# Patient Record
Sex: MALE | Race: WHITE | NOT HISPANIC OR LATINO
[De-identification: names, ages, dates, MRNs, and addresses within clinical notes are randomized per-mention and may not be internally consistent; named-entity substitution may affect disease eponyms.]

---

## 2017-01-27 ENCOUNTER — APPOINTMENT (OUTPATIENT)
Dept: PULMONOLOGY | Facility: CLINIC | Age: 67
End: 2017-01-27

## 2017-05-26 ENCOUNTER — APPOINTMENT (OUTPATIENT)
Dept: PULMONOLOGY | Facility: CLINIC | Age: 67
End: 2017-05-26

## 2017-05-26 VITALS
OXYGEN SATURATION: 97 % | HEIGHT: 64 IN | HEART RATE: 81 BPM | WEIGHT: 155 LBS | TEMPERATURE: 98.6 F | SYSTOLIC BLOOD PRESSURE: 128 MMHG | BODY MASS INDEX: 26.46 KG/M2 | DIASTOLIC BLOOD PRESSURE: 80 MMHG

## 2017-05-26 RX ORDER — ESCITALOPRAM OXALATE 10 MG/1
10 TABLET ORAL
Qty: 30 | Refills: 0 | Status: ACTIVE | COMMUNITY
Start: 2016-09-30

## 2017-06-04 ENCOUNTER — FORM ENCOUNTER (OUTPATIENT)
Age: 67
End: 2017-06-04

## 2017-06-05 ENCOUNTER — OUTPATIENT (OUTPATIENT)
Dept: OUTPATIENT SERVICES | Facility: HOSPITAL | Age: 67
LOS: 1 days | End: 2017-06-05
Payer: MEDICARE

## 2017-06-05 PROCEDURE — 71250 CT THORAX DX C-: CPT

## 2017-06-05 PROCEDURE — 71250 CT THORAX DX C-: CPT | Mod: 26

## 2017-06-20 ENCOUNTER — MESSAGE (OUTPATIENT)
Age: 67
End: 2017-06-20

## 2019-06-05 ENCOUNTER — APPOINTMENT (OUTPATIENT)
Dept: PULMONOLOGY | Facility: CLINIC | Age: 69
End: 2019-06-05
Payer: MEDICARE

## 2019-06-05 VITALS
HEART RATE: 79 BPM | WEIGHT: 153 LBS | BODY MASS INDEX: 26.12 KG/M2 | DIASTOLIC BLOOD PRESSURE: 86 MMHG | HEIGHT: 64 IN | OXYGEN SATURATION: 97 % | SYSTOLIC BLOOD PRESSURE: 130 MMHG | TEMPERATURE: 98.1 F

## 2019-06-05 PROCEDURE — 94729 DIFFUSING CAPACITY: CPT

## 2019-06-05 PROCEDURE — 99213 OFFICE O/P EST LOW 20 MIN: CPT | Mod: 25

## 2019-06-05 PROCEDURE — 94727 GAS DIL/WSHOT DETER LNG VOL: CPT

## 2019-06-05 PROCEDURE — 94060 EVALUATION OF WHEEZING: CPT

## 2019-06-05 NOTE — CONSULT LETTER
[Dear  ___] : Dear  [unfilled], [Courtesy Letter:] : I had the pleasure of seeing your patient, [unfilled], in my office today. [Please see my note below.] : Please see my note below. [Consult Closing:] : Thank you very much for allowing me to participate in the care of this patient.  If you have any questions, please do not hesitate to contact me. [Sincerely,] : Sincerely, [FreeTextEntry2] : Coy Ojeda MD \par [address]\par [address]\[place] [address] [FreeTextEntry3] : Gita Murry M.D.

## 2019-06-05 NOTE — HISTORY OF PRESENT ILLNESS
[FreeTextEntry1] : 4/24/15: Asked to evaluate patient by Dr Jomar Barclay for cough. 40 pack yr former smoker. Cough was improving but he had rales on the left. \par \par 5/22/14: Returns today for reexamination regarding the rales on the left. There is no change in his symptoms. From a lung point of view he is feeling fine without significant cough or dyspnea.\par \par 7/10/15: Returns for re-exam for left lung rales. No dyspnea. Minimal cough, some PND. Nothing he wants treatment for. Shortly thereafter had a normal full PFT and we planned to do a follow up PFT in 6-12 mos.\par \par 1/29/16:Has a cold today but otherwise feels well, no dyspnea or chronic cough. Had full PFT today.\par \par 2/11/16: Mr. Velasquez comes in today for followup after his recent chest CT which demonstrated very mild interstitial changes, which would be consistent with his exam and a lack of symptoms and normal lung function. I took the opportunity to clarify with him that he has no pets, no history of industrial exposures, no history of rheumatologic disease, no history of treatment for any malignancy, and no family history of lung or rheumatologic disease, although his mother did die at 82 of respiratory failure after long history of smoking. He does relate a history of somewhat severe pneumonia as a young child from which he did recover. He remains asymptomatic from a lung point of view.\par \par 7/29/16: Mr. Velasquez remains asymptomatic from a lung point of view. He has mild dyspnea on stairs but is otherwise well. He notes that he has gained a little bit of weight. He has had no intercurrent illnesses but he has been started on a statin since I saw him last.\par \par 5/26/17: Has had a cough for about a month, getting better. Some PND. Not dyspneic. Continues to work full time. Smokes 5 cigars a week!\par \par 6/5/19: Comes in for routine follow up. A few days ago developed a dry cough with a feeling of chest congestion. Symbicort has helped w similar symptoms in the past. Former smoker w no hx asthma. Quit cigarettes 1980. No chronic cough and no dyspnea. No sig change to health.

## 2019-06-05 NOTE — PHYSICAL EXAM
[General Appearance - In No Acute Distress] : no acute distress [Heart Sounds] : normal S1 and S2 [Murmurs] : no murmurs present [FreeTextEntry1] : very scant rales L base

## 2019-06-05 NOTE — ASSESSMENT
[FreeTextEntry1] : Data reviewed:\par \par Chest CT [facility] 6/2017 personally reviewed w pt: stable interstitial abnormality\par \par Rheum panel 2/20/16 negative except EDEN 1:80\par \par PFT 7/15/15: FVC 72%, TLC 91%, DLCO 113%\par PFT 1/29/16: FVC 76%, %, DLCO 116%\par PFT 7/29/16: FVC 75%, TLC 84%, DLCO 84%\par PFT 5/26/17: FVC 77%, %, DLCO 96%\par PFT 6/5/19:  FVC 79%, TLC 86%, DLCO 84%\par \par Impression:\par Subclinical ILD - asymptomatic, normal lung function\par \par Plan:\par No change in his lung function and no chronic symptoms. Defer further imaging at this time.\par Would continue to follow annually.\par Ok to use Symbicort intermittent prn cough. But for chronic cough should return.

## 2019-07-05 ENCOUNTER — RX RENEWAL (OUTPATIENT)
Age: 69
End: 2019-07-05

## 2020-01-27 ENCOUNTER — APPOINTMENT (OUTPATIENT)
Dept: OTOLARYNGOLOGY | Facility: CLINIC | Age: 70
End: 2020-01-27
Payer: MEDICARE

## 2020-01-27 VITALS
HEIGHT: 64 IN | HEART RATE: 65 BPM | SYSTOLIC BLOOD PRESSURE: 115 MMHG | BODY MASS INDEX: 25.1 KG/M2 | WEIGHT: 147 LBS | DIASTOLIC BLOOD PRESSURE: 72 MMHG

## 2020-01-27 DIAGNOSIS — H90.3 SENSORINEURAL HEARING LOSS, BILATERAL: ICD-10-CM

## 2020-01-27 DIAGNOSIS — H69.83 OTHER SPECIFIED DISORDERS OF EUSTACHIAN TUBE, BILATERAL: ICD-10-CM

## 2020-01-27 DIAGNOSIS — H93.13 TINNITUS, BILATERAL: ICD-10-CM

## 2020-01-27 PROCEDURE — 92567 TYMPANOMETRY: CPT

## 2020-01-27 PROCEDURE — 92557 COMPREHENSIVE HEARING TEST: CPT

## 2020-01-27 PROCEDURE — 99203 OFFICE O/P NEW LOW 30 MIN: CPT

## 2020-01-27 NOTE — ASSESSMENT
[FreeTextEntry1] : exam unremarkable\par audio au loss more pronounced on rt excellent disc\par tinitus\par rec hearng aid reeval

## 2020-01-27 NOTE — REVIEW OF SYSTEMS
[Hearing Loss] : hearing loss [Ear Noises] : ear noises [Sense Of Smell Problem] : sense of smell problem [Snoring With Pauses] : snoring with pauses [Itching] : itching [Anxiety] : anxiety [Depression] : depression [Negative] : Heme/Lymph [Patient Intake Form Reviewed] : Patient intake form was reviewed

## 2020-01-27 NOTE — REASON FOR VISIT
[Initial Consultation] : an initial consultation for [Hearing Loss] : hearing loss [FreeTextEntry2] : ringing in ears

## 2020-01-27 NOTE — HISTORY OF PRESENT ILLNESS
[de-identified] : bilateral longstanding buzzing constant\par hx hearing loss hearing aids but not wearing

## 2020-03-02 ENCOUNTER — APPOINTMENT (OUTPATIENT)
Dept: PULMONOLOGY | Facility: CLINIC | Age: 70
End: 2020-03-02

## 2020-04-20 ENCOUNTER — APPOINTMENT (OUTPATIENT)
Dept: OTOLARYNGOLOGY | Facility: CLINIC | Age: 70
End: 2020-04-20

## 2020-09-07 ENCOUNTER — LABORATORY RESULT (OUTPATIENT)
Age: 70
End: 2020-09-07

## 2020-09-10 ENCOUNTER — OUTPATIENT (OUTPATIENT)
Dept: OUTPATIENT SERVICES | Facility: HOSPITAL | Age: 70
LOS: 1 days | End: 2020-09-10
Payer: MEDICARE

## 2020-09-10 DIAGNOSIS — J84.9 INTERSTITIAL PULMONARY DISEASE, UNSPECIFIED: ICD-10-CM

## 2020-09-10 PROCEDURE — 94760 N-INVAS EAR/PLS OXIMETRY 1: CPT

## 2020-09-10 PROCEDURE — 94726 PLETHYSMOGRAPHY LUNG VOLUMES: CPT

## 2020-09-10 PROCEDURE — 94060 EVALUATION OF WHEEZING: CPT

## 2020-09-10 PROCEDURE — 94729 DIFFUSING CAPACITY: CPT

## 2020-09-14 ENCOUNTER — APPOINTMENT (OUTPATIENT)
Dept: PULMONOLOGY | Facility: CLINIC | Age: 70
End: 2020-09-14
Payer: MEDICARE

## 2020-09-14 VITALS
TEMPERATURE: 97.5 F | BODY MASS INDEX: 25.61 KG/M2 | DIASTOLIC BLOOD PRESSURE: 66 MMHG | OXYGEN SATURATION: 97 % | WEIGHT: 150 LBS | HEART RATE: 68 BPM | SYSTOLIC BLOOD PRESSURE: 120 MMHG | HEIGHT: 64 IN

## 2020-09-14 PROCEDURE — 99213 OFFICE O/P EST LOW 20 MIN: CPT

## 2020-09-16 ENCOUNTER — TRANSCRIPTION ENCOUNTER (OUTPATIENT)
Age: 70
End: 2020-09-16

## 2020-09-16 ENCOUNTER — OUTPATIENT (OUTPATIENT)
Dept: OUTPATIENT SERVICES | Facility: HOSPITAL | Age: 70
LOS: 1 days | End: 2020-09-16

## 2020-09-16 ENCOUNTER — APPOINTMENT (OUTPATIENT)
Dept: CT IMAGING | Facility: CLINIC | Age: 70
End: 2020-09-16
Payer: MEDICARE

## 2020-09-16 PROCEDURE — 71250 CT THORAX DX C-: CPT | Mod: 26

## 2020-09-21 NOTE — HISTORY OF PRESENT ILLNESS
[TextBox_4] : My patient since 2015 w subclinical ILD, 40py former smoker, current cigar smoker, with no other evidence exposures.\par \par 9/14/20: Comes for routine follow up. No change to chest. No dyspnea, no cough, still smoking cigars. Had a GI illness in Feb, no chest symptoms, fever or anosmia; didn't need care, just waited it out, Covid Abs subsequently negative.

## 2020-09-21 NOTE — ASSESSMENT
[FreeTextEntry1] : Data reviewed:\par \par Chest CT [facility] 6/2017 personally reviewed w pt: stable interstitial abnormality\par \par Rheum panel 2/20/16 negative except EDEN 1:80\par \par PFT 7/15/15: FVC 72%, TLC 91%, DLCO 113%\par PFT 1/29/16: FVC 76%, %, DLCO 116%\par PFT 7/29/16: FVC 75%, TLC 84%, DLCO 84%\par PFT 5/26/17: FVC 77%, %, DLCO 96%\par PFT 6/5/19: FVC 79%, TLC 86%, DLCO 84%\par PFT 9/10/20: FVC 2.35L (67%), TLC 4.57L (90%), DLCO 16.6 (77%)\par \par Impression:\par Subclinical ILD - asymptomatic, normal lung function\par \par Plan:\par No change in his lung function and no chronic symptoms.\par As it's been 3 years since his previous CT, will repeat a CT now to assess for progression.\par --\par CT chest [facility] 9/16/20 personally reviewed :\par 1. Since June 5, 2017, slight increased patchy subpleural reticulation suggesting fibrosis more prominent in posterior lower lobes with traction bronchiolectasis. Given no definite honeycombing and small focus peripheral groundglass opacity in left lower lobe findings are indeterminate. Possibly interstitial lung disease UIP pattern versus fibrotic NSIP. Histologic sampling might be considered. \par 2. Expiratory air trapping suggesting small airways disease. \par 3. 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 \par \par The progression is really minimal over 3 years and still no specific features. Will repeat PFT in a year and consider repeat CT at that time. LM on .

## 2020-09-21 NOTE — PHYSICAL EXAM
[Normal Rate/Rhythm] : normal rate/rhythm [No Acute Distress] : no acute distress [Normal S1, S2] : normal s1, s2 [No Resp Distress] : no resp distress [No Murmurs] : no murmurs [TextBox_68] : dano dyson

## 2021-11-08 ENCOUNTER — LABORATORY RESULT (OUTPATIENT)
Age: 71
End: 2021-11-08

## 2021-11-11 ENCOUNTER — APPOINTMENT (OUTPATIENT)
Dept: PULMONOLOGY | Facility: CLINIC | Age: 71
End: 2021-11-11

## 2021-11-11 ENCOUNTER — APPOINTMENT (OUTPATIENT)
Dept: PULMONOLOGY | Facility: CLINIC | Age: 71
End: 2021-11-11
Payer: MEDICARE

## 2021-11-11 VITALS
BODY MASS INDEX: 24.75 KG/M2 | OXYGEN SATURATION: 97 % | SYSTOLIC BLOOD PRESSURE: 121 MMHG | TEMPERATURE: 98 F | HEART RATE: 83 BPM | HEIGHT: 64 IN | WEIGHT: 145 LBS | DIASTOLIC BLOOD PRESSURE: 78 MMHG

## 2021-11-11 PROCEDURE — 99213 OFFICE O/P EST LOW 20 MIN: CPT | Mod: 25

## 2021-11-11 PROCEDURE — 94727 GAS DIL/WSHOT DETER LNG VOL: CPT

## 2021-11-11 PROCEDURE — 94729 DIFFUSING CAPACITY: CPT

## 2021-11-11 PROCEDURE — 94060 EVALUATION OF WHEEZING: CPT

## 2021-11-11 NOTE — ASSESSMENT
[FreeTextEntry1] : Data reviewed:\par \par CT chest [facility] 9/16/20 personally reviewed :\par 1. Since June 5, 2017, slight increased patchy subpleural reticulation suggesting fibrosis more prominent in posterior lower lobes with traction bronchiolectasis. Given no definite honeycombing and small focus peripheral groundglass opacity in left lower lobe findings are indeterminate. Possibly interstitial lung disease UIP pattern versus fibrotic NSIP. Histologic sampling might be considered. \par 2. Expiratory air trapping suggesting small airways disease. \par 3. 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 \par \par Rheum panel 2/20/16 negative except EDEN 1:80\par \par PFT 7/15/15: FVC 2.67L (72%), FEV1 2.43L (88%), TLC 4.70 (91%), DLCO 20.0 (113%)\par PFT 1/29/16: FVC 2.82L (76%), %, DLCO 20.5 (116%)\par PFT 7/29/16: FVC 2.75L (75%), TLC 84%, DLCO 16.9 (97%)\par PFT 5/26/17: FVC 2.83L (77%), %, DLCO 17.9 (96%)\par PFT 6/5/19: FVC 2.85L (79%), TLC 86%, DLCO 15.1 (84%)\par PFT 9/10/20: FVC 2.35L (67%), TLC 4.57L (90%), DLCO 16.6 (77%)\par PFT 11/11/21: FVC 2.75L (83%), FEV1 2.30 (95%), TLC 3.95L (69%), DLCO 15.9 (69%)\par \par Impression:\par Subclinical ILD - asymptomatic, nearly normal lung function\par \par Plan:\par He remains stable with this mild or subclinical ILD. A specific diagnosis would require a lung biopsy and we agree that this is not reasonable at this time. We will continue to monitor w PFT in one year and of course he will call w any clinical change. He is fully vaxxed.

## 2021-11-11 NOTE — HISTORY OF PRESENT ILLNESS
[TextBox_4] : My patient since 2015 w subclinical ILD, 40py former smoker, current cigar smoker, with no other evidence exposures. Did work around [employer], not there on 9/11 however.\par \par 9/14/20: Comes for routine follow up. No change to chest. No dyspnea, no cough, still smoking cigars. Had a GI illness in Feb, no chest symptoms, fever or anosmia; didn't need care, just waited it out, Covid Abs subsequently negative.\par \par 11/11/21: Doing absolutely fine. Has had no illness at all. Still smoking cigars. 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 No formal exercise.

## 2021-11-11 NOTE — CONSULT LETTER
[Dear  ___] : Dear  [unfilled], [Courtesy Letter:] : I had the pleasure of seeing your patient, [unfilled], in my office today. [Please see my note below.] : Please see my note below. [Consult Closing:] : Thank you very much for allowing me to participate in the care of this patient.  If you have any questions, please do not hesitate to contact me. [Sincerely,] : Sincerely, [FreeTextEntry2] : Coy Ojeda MD \par [address] [facility] [address]\par [address]\[place] [address] [FreeTextEntry3] : Gita Murry M.D.

## 2021-11-11 NOTE — PHYSICAL EXAM
[No Acute Distress] : no acute distress [Normal Rate/Rhythm] : normal rate/rhythm [Normal S1, S2] : normal s1, s2 [No Murmurs] : no murmurs [No Resp Distress] : no resp distress [TextBox_68] : dano dyson

## 2022-11-10 ENCOUNTER — APPOINTMENT (OUTPATIENT)
Dept: PULMONOLOGY | Facility: CLINIC | Age: 72
End: 2022-11-10

## 2023-04-12 ENCOUNTER — APPOINTMENT (OUTPATIENT)
Dept: PULMONOLOGY | Facility: CLINIC | Age: 73
End: 2023-04-12
Payer: MEDICARE

## 2023-04-12 VITALS
SYSTOLIC BLOOD PRESSURE: 126 MMHG | HEART RATE: 76 BPM | BODY MASS INDEX: 25.02 KG/M2 | HEIGHT: 63.5 IN | OXYGEN SATURATION: 97 % | WEIGHT: 143 LBS | DIASTOLIC BLOOD PRESSURE: 80 MMHG | TEMPERATURE: 98 F

## 2023-04-12 PROCEDURE — 94727 GAS DIL/WSHOT DETER LNG VOL: CPT

## 2023-04-12 PROCEDURE — 99214 OFFICE O/P EST MOD 30 MIN: CPT | Mod: 25

## 2023-04-12 PROCEDURE — 94060 EVALUATION OF WHEEZING: CPT

## 2023-04-12 PROCEDURE — 94729 DIFFUSING CAPACITY: CPT

## 2023-04-12 NOTE — ASSESSMENT
[FreeTextEntry1] : Data reviewed:\par \par CT chest [facility] 9/16/20 personally reviewed :\par 1. Since June 5, 2017, slight increased patchy subpleural reticulation suggesting fibrosis more prominent in posterior lower lobes with traction bronchiolectasis. Given no definite honeycombing and small focus peripheral groundglass opacity in left lower lobe findings are indeterminate. Possibly interstitial lung disease UIP pattern versus fibrotic NSIP. Histologic sampling might be considered. \par 2. Expiratory air trapping suggesting small airways disease. \par 3. 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 \par \par Rheum panel 2/20/16 negative except EDEN 1:80\par \par PFT 7/15/15: FVC 2.67L (72%), FEV1 2.43L (88%), TLC 4.70 (91%), DLCO 20.0 (113%)\par PFT 1/29/16: FVC 2.82L (76%), %, DLCO 20.5 (116%)\par PFT 7/29/16: FVC 2.75L (75%), TLC 84%, DLCO 16.9 (97%)\par PFT 5/26/17: FVC 2.83L (77%), %, DLCO 17.9 (96%)\par PFT 6/5/19: FVC 2.85L (79%), TLC 86%, DLCO 15.1 (84%)\par PFT 9/10/20: FVC 2.35L (67%), TLC 4.57L (90%), DLCO 16.6 (77%)\par PFT 11/11/21: FVC 2.75L (83%), FEV1 2.30 (95%), TLC 3.95L (69%), DLCO 15.9 (69%)\par PFT 4/12/2023: FVC 2.66L (81%), FEV1 2.29L (97%), TLC 4.01L (70%), DLCO 15.08 (72%)\par \par Impression:\par Interstitial lung abnormality - asymptomatic, nearly normal lung function\par \par Plan:\par His DLCO is slowly trending down over 8 years.\par Repeat HRCT now.\par Advise smoking cessation and regular exercise.\par

## 2023-04-16 ENCOUNTER — APPOINTMENT (OUTPATIENT)
Dept: CT IMAGING | Facility: HOSPITAL | Age: 73
End: 2023-04-16

## 2023-04-16 ENCOUNTER — OUTPATIENT (OUTPATIENT)
Dept: OUTPATIENT SERVICES | Facility: HOSPITAL | Age: 73
LOS: 1 days | End: 2023-04-16
Payer: MEDICARE

## 2023-04-16 PROCEDURE — 71250 CT THORAX DX C-: CPT

## 2023-04-16 PROCEDURE — 71250 CT THORAX DX C-: CPT | Mod: 26,MH

## 2023-05-02 ENCOUNTER — APPOINTMENT (OUTPATIENT)
Dept: PULMONOLOGY | Facility: CLINIC | Age: 73
End: 2023-05-02
Payer: MEDICARE

## 2023-05-02 VITALS
TEMPERATURE: 98.4 F | WEIGHT: 143 LBS | BODY MASS INDEX: 25.02 KG/M2 | DIASTOLIC BLOOD PRESSURE: 84 MMHG | HEART RATE: 77 BPM | OXYGEN SATURATION: 98 % | HEIGHT: 63.5 IN | SYSTOLIC BLOOD PRESSURE: 110 MMHG

## 2023-05-02 PROCEDURE — 99213 OFFICE O/P EST LOW 20 MIN: CPT

## 2023-05-02 RX ORDER — DULOXETINE HYDROCHLORIDE 60 MG/1
60 CAPSULE, DELAYED RELEASE PELLETS ORAL
Qty: 90 | Refills: 0 | Status: DISCONTINUED | COMMUNITY
Start: 2019-11-21 | End: 2023-05-02

## 2023-05-02 RX ORDER — DULOXETINE HYDROCHLORIDE 60 MG/1
60 CAPSULE, DELAYED RELEASE ORAL
Refills: 0 | Status: DISCONTINUED | COMMUNITY
End: 2023-05-02

## 2023-05-02 RX ORDER — DULOXETINE HYDROCHLORIDE 40 MG/1
CAPSULE, DELAYED RELEASE PELLETS ORAL
Refills: 0 | Status: ACTIVE | COMMUNITY

## 2023-05-02 RX ORDER — DULOXETINE HYDROCHLORIDE 30 MG/1
30 CAPSULE, DELAYED RELEASE PELLETS ORAL
Qty: 30 | Refills: 0 | Status: DISCONTINUED | COMMUNITY
Start: 2016-09-30 | End: 2023-05-02

## 2023-05-02 RX ORDER — BUDESONIDE AND FORMOTEROL FUMARATE DIHYDRATE 80; 4.5 UG/1; UG/1
80-4.5 AEROSOL RESPIRATORY (INHALATION) TWICE DAILY
Qty: 1 | Refills: 11 | Status: DISCONTINUED | COMMUNITY
Start: 2017-06-20 | End: 2023-05-02

## 2023-05-02 RX ORDER — MIRTAZAPINE 7.5 MG/1
7.5 TABLET, FILM COATED ORAL
Qty: 60 | Refills: 0 | Status: DISCONTINUED | COMMUNITY
Start: 2020-01-16 | End: 2023-05-02

## 2023-05-02 NOTE — CONSULT LETTER
[Dear  ___] : Dear  [unfilled], [Courtesy Letter:] : I had the pleasure of seeing your patient, [unfilled], in my office today. [Please see my note below.] : Please see my note below. [Consult Closing:] : Thank you very much for allowing me to participate in the care of this patient.  If you have any questions, please do not hesitate to contact me. [Sincerely,] : Sincerely, [FreeTextEntry2] : Coy Ojeda MD \par [address] [facility]\par [address]\[place] [address] [FreeTextEntry3] : Gita Murry M.D.

## 2023-05-02 NOTE — HISTORY OF PRESENT ILLNESS
[TextBox_4] : My patient since 2015 w subclinical ILD, 40py former smoker, current cigar smoker, with no other evidence exposures. Did work around [employer], not there on 9/11 however.\par \par 9/14/20: Comes for routine follow up. No change to chest. No dyspnea, no cough, still smoking cigars. Had a GI illness in Feb, no chest symptoms, fever or anosmia; didn't need care, just waited it out, Covid Abs subsequently negative.\par \par 11/11/21: Doing absolutely fine. Has had no illness at all. Still smoking cigars. 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 Some cough, no sig dyspnea. Rejoined his gym. Lives in [place], very walkable.\par \par 5/2/2023: He returns at my request to discuss his recent scan. He has been having some constipation but otherwise he feels the same.

## 2023-06-21 ENCOUNTER — APPOINTMENT (OUTPATIENT)
Dept: PULMONOLOGY | Facility: CLINIC | Age: 73
End: 2023-06-21
Payer: MEDICARE

## 2023-06-21 VITALS
HEIGHT: 63.5 IN | WEIGHT: 143 LBS | OXYGEN SATURATION: 98 % | TEMPERATURE: 89.3 F | SYSTOLIC BLOOD PRESSURE: 126 MMHG | HEART RATE: 99 BPM | BODY MASS INDEX: 25.02 KG/M2 | DIASTOLIC BLOOD PRESSURE: 70 MMHG

## 2023-06-21 PROCEDURE — 99213 OFFICE O/P EST LOW 20 MIN: CPT | Mod: 25,GC

## 2023-06-21 PROCEDURE — 36415 COLL VENOUS BLD VENIPUNCTURE: CPT | Mod: GC

## 2023-06-21 NOTE — PHYSICAL EXAM
[No Acute Distress] : no acute distress [Normal Oropharynx] : normal oropharynx [Normal Appearance] : normal appearance [No Neck Mass] : no neck mass [Normal Rate/Rhythm] : normal rate/rhythm [Normal S1, S2] : normal s1, s2 [No Murmurs] : no murmurs [No Resp Distress] : no resp distress [Clear to Auscultation Bilaterally] : clear to auscultation bilaterally [No Abnormalities] : no abnormalities [Benign] : benign [No Clubbing] : no clubbing [No Cyanosis] : no cyanosis [No Edema] : no edema [Normal Color/ Pigmentation] : normal color/ pigmentation [Oriented x3] : oriented x3 [Normal Affect] : normal affect [TextBox_68] : faint bibasilar fine crackles

## 2023-06-21 NOTE — HISTORY OF PRESENT ILLNESS
[TextBox_4] : My patient since 2015 w subclinical ILD, 40py former smoker, current cigar smoker, with no other evidence exposures. Did work around [employer], not there on 9/11 however.\par \par 9/14/20: Comes for routine follow up. No change to chest. No dyspnea, no cough, still smoking cigars. Had a GI illness in Feb, no chest symptoms, fever or anosmia; didn't need care, just waited it out, Covid Abs subsequently negative.\par \par 11/11/21: Doing absolutely fine. Has had no illness at all. Still smoking cigars. 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 Some cough, no sig dyspnea. Rejoined his gym. Lives in [place], very walkable.\par \par 5/2/2023: He returns at my request to discuss his recent scan. He has been having some constipation but otherwise he feels the same.\par \par 6/21/23 [Alexa]: here today for follow up and to discuss initiation of anti fibrotic agents. Discussed with his specialty pharmacy and insurance - out of pocket costs for ofev will be over 3000 dollars for the first month and subsequently over 600 dollars monthly. Fatigued, but otherwise no jenny dyspnea. Occasionally will smoke a cigar due to stress from work, but trying to stop. Otherwise no hospitalizations and healthy and well.

## 2023-06-21 NOTE — ASSESSMENT
[FreeTextEntry1] : Data reviewed:\par \par CT chest [facility] 4/2023 personally reviewed : worsening of probable UIP pattern compared to 2020, 2016\par \par Rheum panel 2/20/16 negative except EDEN 1:80\par \par PFT 7/15/15: FVC 2.67L (72%), FEV1 2.43L (88%), TLC 4.70 (91%), DLCO 20.0 (113%)\par PFT 1/29/16: FVC 2.82L (76%), %, DLCO 20.5 (116%)\par PFT 7/29/16: FVC 2.75L (75%), TLC 84%, DLCO 16.9 (97%)\par PFT 5/26/17: FVC 2.83L (77%), %, DLCO 17.9 (96%)\par PFT 6/5/19: FVC 2.85L (79%), TLC 86%, DLCO 15.1 (84%)\par PFT 9/10/20: FVC 2.35L (67%), TLC 4.57L (90%), DLCO 16.6 (77%)\par PFT 11/11/21: FVC 2.75L (83%), FEV1 2.30 (95%), TLC 3.95L (69%), DLCO 15.9 (69%)\par PFT 4/12/2023: FVC 2.66L (81%), FEV1 2.29L (97%), TLC 4.01L (70%), DLCO 15.08 (72%)\par \par Impression:\par Interstitial lung disesase / probable UIP - asymptomatic\par \par Plan:\par This healthy 71yo man has slowly progressive ILD with a declining DLCO and a worsening scan. His disease remains mild. He is asymptomatic.\par We could continue to monitor. However, I think we could also put him on antifibrotics at this point with a goal of reducing progression over time. We discussed the pros and cons and he is still agreeable to start, however, given financial contraints will opt for esbriet over ofev. \par LFTs in office today and will order esbriet. LFTs monthly x first 3 mos.\par \par RTC in 6 weeks. Hope for him to beon esbriet for 1 month at that point. \par --\par Attending Addendum\par \par Seen and examined by me and agree w above. Out of pocket costs for Ofev are extreme. Use of Esbriet is perfectly reasonable here w much lower costs associated. Check LFTs today and start Esbriet, follow in 6 weeks.

## 2023-06-21 NOTE — CONSULT LETTER
[Dear  ___] : Dear  [unfilled], [Courtesy Letter:] : I had the pleasure of seeing your patient, [unfilled], in my office today. [Please see my note below.] : Please see my note below. [Consult Closing:] : Thank you very much for allowing me to participate in the care of this patient.  If you have any questions, please do not hesitate to contact me. [Sincerely,] : Sincerely, [FreeTextEntry2] : Coy Ojeda MD \par [address] [facility]\par [address]\[place] [address] [FreeTextEntry3] : Gita Murry M.D.

## 2023-06-22 LAB
ALBUMIN SERPL ELPH-MCNC: 4.6 G/DL
ALP BLD-CCNC: 80 U/L
ALT SERPL-CCNC: 18 U/L
ANION GAP SERPL CALC-SCNC: 13 MMOL/L
AST SERPL-CCNC: 14 U/L
BILIRUB SERPL-MCNC: 0.6 MG/DL
BUN SERPL-MCNC: 21 MG/DL
CALCIUM SERPL-MCNC: 9.6 MG/DL
CHLORIDE SERPL-SCNC: 103 MMOL/L
CO2 SERPL-SCNC: 25 MMOL/L
CREAT SERPL-MCNC: 1.27 MG/DL
EGFR: 60 ML/MIN/1.73M2
GLUCOSE SERPL-MCNC: 141 MG/DL
POTASSIUM SERPL-SCNC: 4.5 MMOL/L
PROT SERPL-MCNC: 7.3 G/DL
SODIUM SERPL-SCNC: 142 MMOL/L

## 2023-08-14 ENCOUNTER — NON-APPOINTMENT (OUTPATIENT)
Age: 73
End: 2023-08-14

## 2023-08-15 RX ORDER — BUDESONIDE AND FORMOTEROL FUMARATE DIHYDRATE 80; 4.5 UG/1; UG/1
80-4.5 AEROSOL RESPIRATORY (INHALATION) TWICE DAILY
Qty: 1 | Refills: 3 | Status: DISCONTINUED | COMMUNITY
Start: 2023-08-14 | End: 2023-08-15

## 2023-08-17 ENCOUNTER — APPOINTMENT (OUTPATIENT)
Dept: PULMONOLOGY | Facility: CLINIC | Age: 73
End: 2023-08-17
Payer: MEDICARE

## 2023-08-17 VITALS
SYSTOLIC BLOOD PRESSURE: 118 MMHG | DIASTOLIC BLOOD PRESSURE: 70 MMHG | HEART RATE: 91 BPM | WEIGHT: 143 LBS | OXYGEN SATURATION: 95 % | TEMPERATURE: 98.1 F | HEIGHT: 63.5 IN | BODY MASS INDEX: 25.02 KG/M2

## 2023-08-17 PROCEDURE — 99213 OFFICE O/P EST LOW 20 MIN: CPT | Mod: 25

## 2023-08-17 PROCEDURE — 36415 COLL VENOUS BLD VENIPUNCTURE: CPT

## 2023-08-17 NOTE — HISTORY OF PRESENT ILLNESS
[TextBox_4] : My patient since 2015 w subclinical ILD, 40py former smoker, current cigar smoker, with no other evidence exposures. Did work around [employer], not there on 9/11 however.  9/14/20: Comes for routine follow up. No change to chest. No dyspnea, no cough, still smoking cigars. Had a GI illness in Feb, no chest symptoms, fever or anosmia; didn't need care, just waited it out, Covid Abs subsequently negative.  11/11/21: Doing absolutely fine. Has had no illness at all. Still smoking cigars. 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 No formal exercise.  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 Some cough, no sig dyspnea. Rejoined his gym. Lives in [place], very walkable.  5/2/2023: He returns at my request to discuss his recent scan. He has been having some constipation but otherwise he feels the same.  6/21/23 [Alexa]: here today for follow up and to discuss initiation of anti fibrotic agents. Discussed with his specialty pharmacy and insurance - out of pocket costs for ofev will be over 3000 dollars for the first month and subsequently over 600 dollars monthly. Fatigued, but otherwise no jenny dyspnea. Occasionally will smoke a cigar due to stress from work, but trying to stop. Otherwise no hospitalizations and healthy and well.  8/17/2023: He returns after initiating pirfenidone. He experienced no GI side effects. He did find it difficult to organize his day around the need to eat tid at specific intervals to allow for taking the medication. He felt tired after taking the medication. His insurance approved the first month but then would not approve the next month so he's been out of meds for 11 days.

## 2023-08-17 NOTE — ASSESSMENT
[FreeTextEntry1] : Data reviewed:  CT chest [facility] 4/2023 prev reviewed : worsening of probable UIP pattern compared to 2020, 2016  Rheum panel 2/20/16 negative except EDEN 1:80  PFT 7/15/15: FVC 2.67L (72%), FEV1 2.43L (88%), TLC 4.70 (91%), DLCO 20.0 (113%) PFT 1/29/16: FVC 2.82L (76%), %, DLCO 20.5 (116%) PFT 7/29/16: FVC 2.75L (75%), TLC 84%, DLCO 16.9 (97%) PFT 5/26/17: FVC 2.83L (77%), %, DLCO 17.9 (96%) PFT 6/5/19: FVC 2.85L (79%), TLC 86%, DLCO 15.1 (84%) PFT 9/10/20: FVC 2.35L (67%), TLC 4.57L (90%), DLCO 16.6 (77%) PFT 11/11/21: FVC 2.75L (83%), FEV1 2.30 (95%), TLC 3.95L (69%), DLCO 15.9 (69%) PFT 4/12/2023: FVC 2.66L (81%), FEV1 2.29L (97%), TLC 4.01L (70%), DLCO 15.08 (72%)  Impression: Interstitial lung disesase / probable UIP - asymptomatic Esbriet 7/2023 -  Plan: Continue with Esbriet. We got the continuation of therapy approved. Check LFTs today. Return 6 weeks.

## 2023-08-21 LAB
ALBUMIN SERPL ELPH-MCNC: 4.5 G/DL
ALP BLD-CCNC: 64 U/L
ALT SERPL-CCNC: 14 U/L
AST SERPL-CCNC: 11 U/L
BILIRUB DIRECT SERPL-MCNC: 0.1 MG/DL
BILIRUB INDIRECT SERPL-MCNC: 0.3 MG/DL
BILIRUB SERPL-MCNC: 0.4 MG/DL
PROT SERPL-MCNC: 6.9 G/DL

## 2023-09-28 ENCOUNTER — APPOINTMENT (OUTPATIENT)
Dept: PULMONOLOGY | Facility: CLINIC | Age: 73
End: 2023-09-28
Payer: MEDICARE

## 2023-09-28 VITALS
HEART RATE: 82 BPM | OXYGEN SATURATION: 97 % | DIASTOLIC BLOOD PRESSURE: 84 MMHG | TEMPERATURE: 98.1 F | WEIGHT: 143 LBS | SYSTOLIC BLOOD PRESSURE: 120 MMHG | BODY MASS INDEX: 25.34 KG/M2 | HEIGHT: 63 IN

## 2023-09-28 PROCEDURE — 90662 IIV NO PRSV INCREASED AG IM: CPT

## 2023-09-28 PROCEDURE — 99213 OFFICE O/P EST LOW 20 MIN: CPT | Mod: 25

## 2023-09-28 PROCEDURE — G0008: CPT

## 2023-09-29 LAB
ALBUMIN SERPL ELPH-MCNC: 4.6 G/DL
ALP BLD-CCNC: 71 U/L
ALT SERPL-CCNC: 21 U/L
AST SERPL-CCNC: 13 U/L
BILIRUB DIRECT SERPL-MCNC: 0.1 MG/DL
BILIRUB INDIRECT SERPL-MCNC: 0.3 MG/DL
BILIRUB SERPL-MCNC: 0.4 MG/DL
PROT SERPL-MCNC: 7 G/DL

## 2023-10-18 ENCOUNTER — RX RENEWAL (OUTPATIENT)
Age: 73
End: 2023-10-18

## 2023-10-24 ENCOUNTER — NON-APPOINTMENT (OUTPATIENT)
Age: 73
End: 2023-10-24

## 2023-10-24 ENCOUNTER — APPOINTMENT (OUTPATIENT)
Dept: OPHTHALMOLOGY | Facility: CLINIC | Age: 73
End: 2023-10-24
Payer: MEDICARE

## 2023-10-24 PROCEDURE — 92004 COMPRE OPH EXAM NEW PT 1/>: CPT

## 2023-10-24 PROCEDURE — 92015 DETERMINE REFRACTIVE STATE: CPT

## 2023-10-27 ENCOUNTER — APPOINTMENT (OUTPATIENT)
Dept: PULMONOLOGY | Facility: CLINIC | Age: 73
End: 2023-10-27
Payer: MEDICARE

## 2023-10-27 VITALS
WEIGHT: 143 LBS | HEART RATE: 69 BPM | BODY MASS INDEX: 25.34 KG/M2 | DIASTOLIC BLOOD PRESSURE: 85 MMHG | HEIGHT: 63 IN | OXYGEN SATURATION: 98 % | SYSTOLIC BLOOD PRESSURE: 140 MMHG | TEMPERATURE: 97.7 F

## 2023-10-27 PROCEDURE — 36415 COLL VENOUS BLD VENIPUNCTURE: CPT

## 2023-10-27 PROCEDURE — 99213 OFFICE O/P EST LOW 20 MIN: CPT | Mod: 25

## 2023-10-30 LAB
ALBUMIN SERPL ELPH-MCNC: 4.7 G/DL
ALP BLD-CCNC: 74 U/L
ALT SERPL-CCNC: 20 U/L
AST SERPL-CCNC: 15 U/L
BILIRUB DIRECT SERPL-MCNC: 0.1 MG/DL
BILIRUB INDIRECT SERPL-MCNC: 0.2 MG/DL
BILIRUB SERPL-MCNC: 0.3 MG/DL
PROT SERPL-MCNC: 7.2 G/DL

## 2023-11-27 ENCOUNTER — RX RENEWAL (OUTPATIENT)
Age: 73
End: 2023-11-27

## 2023-11-30 ENCOUNTER — APPOINTMENT (OUTPATIENT)
Dept: PULMONOLOGY | Facility: CLINIC | Age: 73
End: 2023-11-30
Payer: MEDICARE

## 2023-11-30 VITALS
HEART RATE: 74 BPM | OXYGEN SATURATION: 95 % | WEIGHT: 143 LBS | TEMPERATURE: 97.7 F | SYSTOLIC BLOOD PRESSURE: 118 MMHG | BODY MASS INDEX: 25.34 KG/M2 | HEIGHT: 63 IN | DIASTOLIC BLOOD PRESSURE: 86 MMHG

## 2023-11-30 PROCEDURE — 36415 COLL VENOUS BLD VENIPUNCTURE: CPT

## 2023-11-30 PROCEDURE — 99213 OFFICE O/P EST LOW 20 MIN: CPT | Mod: 25

## 2023-12-01 LAB
ALBUMIN SERPL ELPH-MCNC: 4.4 G/DL
ALP BLD-CCNC: 89 U/L
ALT SERPL-CCNC: 24 U/L
AST SERPL-CCNC: 14 U/L
BILIRUB DIRECT SERPL-MCNC: 0.1 MG/DL
BILIRUB INDIRECT SERPL-MCNC: 0.2 MG/DL
BILIRUB SERPL-MCNC: 0.2 MG/DL
PROT SERPL-MCNC: 6.7 G/DL

## 2024-01-23 ENCOUNTER — APPOINTMENT (OUTPATIENT)
Dept: PULMONOLOGY | Facility: CLINIC | Age: 74
End: 2024-01-23
Payer: MEDICARE

## 2024-01-23 VITALS
DIASTOLIC BLOOD PRESSURE: 80 MMHG | BODY MASS INDEX: 25.34 KG/M2 | SYSTOLIC BLOOD PRESSURE: 130 MMHG | OXYGEN SATURATION: 95 % | HEIGHT: 63 IN | WEIGHT: 143 LBS | HEART RATE: 89 BPM | TEMPERATURE: 97.3 F

## 2024-01-23 PROCEDURE — 94729 DIFFUSING CAPACITY: CPT

## 2024-01-23 PROCEDURE — 36415 COLL VENOUS BLD VENIPUNCTURE: CPT

## 2024-01-23 PROCEDURE — 94060 EVALUATION OF WHEEZING: CPT

## 2024-01-23 PROCEDURE — 99213 OFFICE O/P EST LOW 20 MIN: CPT | Mod: 25

## 2024-01-23 PROCEDURE — 94727 GAS DIL/WSHOT DETER LNG VOL: CPT

## 2024-01-24 LAB
ALBUMIN SERPL ELPH-MCNC: 4.4 G/DL
ALP BLD-CCNC: 83 U/L
ALT SERPL-CCNC: 27 U/L
AST SERPL-CCNC: 16 U/L
BILIRUB DIRECT SERPL-MCNC: 0.1 MG/DL
BILIRUB INDIRECT SERPL-MCNC: 0.2 MG/DL
BILIRUB SERPL-MCNC: 0.3 MG/DL
PROT SERPL-MCNC: 7 G/DL

## 2024-02-28 ENCOUNTER — APPOINTMENT (OUTPATIENT)
Dept: PULMONOLOGY | Facility: CLINIC | Age: 74
End: 2024-02-28
Payer: MEDICARE

## 2024-02-28 VITALS
DIASTOLIC BLOOD PRESSURE: 88 MMHG | SYSTOLIC BLOOD PRESSURE: 140 MMHG | WEIGHT: 157 LBS | BODY MASS INDEX: 27.82 KG/M2 | OXYGEN SATURATION: 96 % | HEIGHT: 63 IN | HEART RATE: 87 BPM

## 2024-02-28 PROCEDURE — 36415 COLL VENOUS BLD VENIPUNCTURE: CPT

## 2024-02-28 PROCEDURE — 99213 OFFICE O/P EST LOW 20 MIN: CPT

## 2024-02-28 PROCEDURE — G2211 COMPLEX E/M VISIT ADD ON: CPT

## 2024-02-28 RX ORDER — BUDESONIDE AND FORMOTEROL FUMARATE DIHYDRATE 80; 4.5 UG/1; UG/1
80-4.5 AEROSOL RESPIRATORY (INHALATION) TWICE DAILY
Qty: 1 | Refills: 2 | Status: DISCONTINUED | COMMUNITY
Start: 2024-02-28 | End: 2024-02-28

## 2024-02-28 RX ORDER — FLUTICASONE FUROATE AND VILANTEROL TRIFENATATE 100; 25 UG/1; UG/1
100-25 POWDER RESPIRATORY (INHALATION)
Qty: 1 | Refills: 3 | Status: ACTIVE | COMMUNITY
Start: 2023-08-15 | End: 1900-01-01

## 2024-02-29 LAB
ALBUMIN SERPL ELPH-MCNC: 4.4 G/DL
ALP BLD-CCNC: 87 U/L
ALT SERPL-CCNC: 16 U/L
AST SERPL-CCNC: 12 U/L
BILIRUB DIRECT SERPL-MCNC: 0.1 MG/DL
BILIRUB INDIRECT SERPL-MCNC: 0.2 MG/DL
BILIRUB SERPL-MCNC: 0.3 MG/DL
PROT SERPL-MCNC: 7.1 G/DL

## 2024-05-02 ENCOUNTER — APPOINTMENT (OUTPATIENT)
Dept: PULMONOLOGY | Facility: CLINIC | Age: 74
End: 2024-05-02
Payer: MEDICARE

## 2024-05-02 VITALS
HEART RATE: 89 BPM | WEIGHT: 157 LBS | DIASTOLIC BLOOD PRESSURE: 80 MMHG | BODY MASS INDEX: 27.82 KG/M2 | OXYGEN SATURATION: 96 % | SYSTOLIC BLOOD PRESSURE: 120 MMHG | HEIGHT: 63 IN | TEMPERATURE: 97.6 F

## 2024-05-02 DIAGNOSIS — J84.9 INTERSTITIAL PULMONARY DISEASE, UNSPECIFIED: ICD-10-CM

## 2024-05-02 PROCEDURE — 36415 COLL VENOUS BLD VENIPUNCTURE: CPT

## 2024-05-02 PROCEDURE — G2211 COMPLEX E/M VISIT ADD ON: CPT

## 2024-05-02 PROCEDURE — 99213 OFFICE O/P EST LOW 20 MIN: CPT

## 2024-05-02 RX ORDER — PIRFENIDONE 267 MG/1
267 TABLET, COATED ORAL
Qty: 270 | Refills: 5 | Status: ACTIVE | COMMUNITY
Start: 2023-06-28 | End: 1900-01-01

## 2024-05-03 LAB
ALBUMIN SERPL ELPH-MCNC: 4.6 G/DL
ALP BLD-CCNC: 82 U/L
ALT SERPL-CCNC: 18 U/L
AST SERPL-CCNC: 16 U/L
BILIRUB DIRECT SERPL-MCNC: 0.1 MG/DL
BILIRUB INDIRECT SERPL-MCNC: 0.3 MG/DL
BILIRUB SERPL-MCNC: 0.4 MG/DL
PROT SERPL-MCNC: 7.5 G/DL

## 2024-05-30 NOTE — ASSESSMENT
[FreeTextEntry1] : Data reviewed:  CT chest [facility] 4/2023 prev reviewed : worsening of probable UIP pattern compared to 2020, 2016  Rheum panel 2/20/16 negative except EDEN 1:80  PFT 7/15/15: FVC 2.67L (72%), FEV1 2.43L (88%), TLC 4.70 (91%), DLCO 20.0 (113%) PFT 1/29/16: FVC 2.82L (76%), %, DLCO 20.5 (116%) PFT 7/29/16: FVC 2.75L (75%), TLC 84%, DLCO 16.9 (97%) PFT 5/26/17: FVC 2.83L (77%), %, DLCO 17.9 (96%) PFT 6/5/19: FVC 2.85L (79%), TLC 86%, DLCO 15.1 (84%) PFT 9/10/20: FVC 2.35L (67%), TLC 4.57L (90%), DLCO 16.6 (77%) PFT 11/11/21: FVC 2.75L (83%), FEV1 2.30 (95%), TLC 3.95L (69%), DLCO 15.9 (69%) PFT 4/12/2023: FVC 2.66L (81%), FEV1 2.29L (97%), TLC 4.01L (70%), DLCO 15.08 (72%) PFT 1/23/2024: FVC 2.51L (78%), FEV1 2.12L (91%), TLC 3.84L (67%), DLCO 13.63 (65%)  Impression: Interstitial lung disease / probable UIP - asymptomatic Esbriet 7/2023 -  Plan: Doing great. Continue with Esbriet. Check q3 mos. Return 3 mos.

## 2024-05-30 NOTE — HISTORY OF PRESENT ILLNESS
[Former] : former [TextBox_4] : My patient since 2015 w probable UIP, 40py former smoker, current cigar smoker, with no other evidence exposures. Did work around [employer], not there on 9/11 however. Landlord/tenant . Initiated antifibrotics in 7/2023 for evidence of progression; Ofev was very expensive so he is on pirfenidone.  1/23/2024: Doing generally ok, cont on Esbriet. Echos are fine from Dr Ojeda, stress was fine.  2/28/2024: Doing well. Missed one week Esbriet due to refill issues but back on it. Smoked a couple of cigars. Doing well now. May move to [place] in 2025.  5/2/2024: Comes in for routine follow up. Doing well on Esbriet. Had primary care follow up. Still struggles to remember to take all his doses times around eating. Went to the gym! 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

## 2024-05-30 NOTE — HISTORY OF PRESENT ILLNESS
[Former] : former [TextBox_4] : My patient since 2015 w probable UIP, 40py former smoker, current cigar smoker, with no other evidence exposures. 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  1/23/2024: Doing generally ok, cont on Esbriet. Echos are fine from Dr Ojeda, stress was fine.  2/28/2024: Doing well. Missed one week Esbriet due to refill issues but back on it. Smoked a couple of cigars. Doing well now. May move to [place] in 2025.

## 2024-05-30 NOTE — HISTORY OF PRESENT ILLNESS
[Former] : former [TextBox_4] : My patient since 2015 w probable UIP, 40py former smoker, current cigar smoker, with no other evidence exposures. 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  1/23/2024: Doing generally ok, cont on Esbriet. Echos are fine from Dr Ojeda, stress was fine.

## 2024-05-30 NOTE — ASSESSMENT
[FreeTextEntry1] : Data reviewed:  CT chest [facility] 4/2023 prev reviewed : worsening of probable UIP pattern compared to 2020, 2016  Rheum panel 2/20/16 negative except EDEN 1:80  PFT 7/15/15: FVC 2.67L (72%), FEV1 2.43L (88%), TLC 4.70 (91%), DLCO 20.0 (113%) PFT 1/29/16: FVC 2.82L (76%), %, DLCO 20.5 (116%) PFT 7/29/16: FVC 2.75L (75%), TLC 84%, DLCO 16.9 (97%) PFT 5/26/17: FVC 2.83L (77%), %, DLCO 17.9 (96%) PFT 6/5/19: FVC 2.85L (79%), TLC 86%, DLCO 15.1 (84%) PFT 9/10/20: FVC 2.35L (67%), TLC 4.57L (90%), DLCO 16.6 (77%) PFT 11/11/21: FVC 2.75L (83%), FEV1 2.30 (95%), TLC 3.95L (69%), DLCO 15.9 (69%) PFT 4/12/2023: FVC 2.66L (81%), FEV1 2.29L (97%), TLC 4.01L (70%), DLCO 15.08 (72%) PFT 1/23/2024: FVC 2.51L (78%), FEV1 2.12L (91%), TLC 3.84L (67%), DLCO 13.63 (65%)  Impression: Interstitial lung disease / probable UIP - asymptomatic Esbriet 7/2023 -  Plan: Continue with Esbriet. Check LFTs today and if normal, q3 mos. Return 3 mos.

## 2024-05-30 NOTE — CONSULT LETTER
[Dear  ___] : Dear  [unfilled], [Courtesy Letter:] : I had the pleasure of seeing your patient, [unfilled], in my office today. [Please see my note below.] : Please see my note below. [Consult Closing:] : Thank you very much for allowing me to participate in the care of this patient.  If you have any questions, please do not hesitate to contact me. [Sincerely,] : Sincerely, [FreeTextEntry2] : Coy Ojeda MD [address] [facility], [address] [place] [address]   [FreeTextEntry3] : Gita Murry MD, [facility]

## 2024-05-30 NOTE — ASSESSMENT
[FreeTextEntry1] : Data reviewed:  CT chest [facility] 4/2023 prev reviewed : worsening of probable UIP pattern compared to 2020, 2016  Rheum panel 2/20/16 negative except EDEN 1:80  PFT 7/15/15: FVC 2.67L (72%), FEV1 2.43L (88%), TLC 4.70 (91%), DLCO 20.0 (113%) PFT 1/29/16: FVC 2.82L (76%), %, DLCO 20.5 (116%) PFT 7/29/16: FVC 2.75L (75%), TLC 84%, DLCO 16.9 (97%) PFT 5/26/17: FVC 2.83L (77%), %, DLCO 17.9 (96%) PFT 6/5/19: FVC 2.85L (79%), TLC 86%, DLCO 15.1 (84%) PFT 9/10/20: FVC 2.35L (67%), TLC 4.57L (90%), DLCO 16.6 (77%) PFT 11/11/21: FVC 2.75L (83%), FEV1 2.30 (95%), TLC 3.95L (69%), DLCO 15.9 (69%) PFT 4/12/2023: FVC 2.66L (81%), FEV1 2.29L (97%), TLC 4.01L (70%), DLCO 15.08 (72%) PFT 1/23/2024: FVC 2.51L (78%), FEV1 2.12L (91%), TLC 3.84L (67%), DLCO 13.63 (65%)  Impression: Interstitial lung disease / probable UIP - asymptomatic Esbriet 7/2023 -  Plan: Continue with Esbriet. Check LFTs today and monthly for first 6 mos, then q 3 mos after that. Return 4 weeks.

## 2024-08-01 ENCOUNTER — APPOINTMENT (OUTPATIENT)
Dept: PULMONOLOGY | Facility: CLINIC | Age: 74
End: 2024-08-01
Payer: MEDICARE

## 2024-08-01 VITALS
OXYGEN SATURATION: 95 % | WEIGHT: 157 LBS | HEART RATE: 94 BPM | TEMPERATURE: 97.5 F | HEIGHT: 63 IN | BODY MASS INDEX: 27.82 KG/M2 | SYSTOLIC BLOOD PRESSURE: 131 MMHG | DIASTOLIC BLOOD PRESSURE: 82 MMHG

## 2024-08-01 PROCEDURE — 94010 BREATHING CAPACITY TEST: CPT

## 2024-08-01 PROCEDURE — 99213 OFFICE O/P EST LOW 20 MIN: CPT | Mod: 25,GC

## 2024-08-01 PROCEDURE — 36415 COLL VENOUS BLD VENIPUNCTURE: CPT | Mod: GC

## 2024-08-01 PROCEDURE — 94729 DIFFUSING CAPACITY: CPT

## 2024-08-01 PROCEDURE — 94727 GAS DIL/WSHOT DETER LNG VOL: CPT

## 2024-08-01 RX ORDER — OMEPRAZOLE 20 MG/1
20 CAPSULE, DELAYED RELEASE ORAL DAILY
Qty: 1 | Refills: 5 | Status: ACTIVE | COMMUNITY
Start: 2024-08-01 | End: 1900-01-01

## 2024-08-01 NOTE — CONSULT LETTER
[Dear  ___] : Dear  [unfilled], [Courtesy Letter:] : I had the pleasure of seeing your patient, [unfilled], in my office today. [Please see my note below.] : Please see my note below. [Consult Closing:] : Thank you very much for allowing me to participate in the care of this patient.  If you have any questions, please do not hesitate to contact me. [Sincerely,] : Sincerely, [FreeTextEntry2] : Coy Ojeda MD [address] [facility], [address] [place] [address]   [FreeTextEntry3] : Gita Murry MD, [facility]

## 2024-08-01 NOTE — HISTORY OF PRESENT ILLNESS
[Former] : former [TextBox_4] : My patient since 2015 w probable UIP, 40py former smoker, current cigar smoker, with no other evidence exposures. Did work around [employer], not there on 9/11 however. Landlord/tenant . Initiated antifibrotics in 7/2023 for evidence of progression; Ofev was very expensive so he is on pirfenidone.  1/23/2024: Doing generally ok, cont on Esbriet. Echos are fine from Dr Ojeda, stress was fine.  2/28/2024: Doing well. Missed one week Esbriet due to refill issues but back on it. Smoked a couple of cigars. Doing well now. May move to [place] in 2025.  5/2/2024: Comes in for routine follow up. Doing well on Esbriet. Had primary care follow up. Still struggles to remember to take all his doses times around eating. Went to the gym! 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  8/1/2024 [[place]]: Presenting for routine follow up and repeat blood work. Continues to take Esbriet, reports intermittently missing doses due to skipping breakfast, but now trying to take it early in the morning with a banana. Endorsing new cough over the last two weeks. Nonproductive; no fevers ,chills , sore throat, sinus pain, post nasal drip, or sick contacts. Has mild improvement with Symbicort use, OTC coug suppressant provides relief for up to 4 hours.

## 2024-08-01 NOTE — REVIEW OF SYSTEMS
[Cough] : cough [GERD] : gerd [Fever] : no fever [Fatigue] : no fatigue [Chills] : no chills [Hemoptysis] : no hemoptysis [Sputum] : no sputum [Dyspnea] : no dyspnea [SOB on Exertion] : no sob on exertion [Chest Discomfort] : no chest discomfort [Edema] : no edema [Syncope] : no syncope [Watery Eyes] : no watery eyes [Nasal Discharge] : no nasal discharge [Diarrhea] : no diarrhea [Constipation] : no constipation [Headache] : no headache [Dizziness] : no dizziness

## 2024-08-01 NOTE — ASSESSMENT
[FreeTextEntry1] : Data reviewed:  CT chest [facility] 4/2023 prev reviewed : worsening of probable UIP pattern compared to 2020, 2016  Rheum panel 2/20/16 negative except EDEN 1:80  PFT 7/15/15: FVC 2.67L (72%), FEV1 2.43L (88%), TLC 4.70 (91%), DLCO 20.0 (113%) PFT 1/29/16: FVC 2.82L (76%), %, DLCO 20.5 (116%) PFT 7/29/16: FVC 2.75L (75%), TLC 84%, DLCO 16.9 (97%) PFT 5/26/17: FVC 2.83L (77%), %, DLCO 17.9 (96%) PFT 6/5/19: FVC 2.85L (79%), TLC 86%, DLCO 15.1 (84%) PFT 9/10/20: FVC 2.35L (67%), TLC 4.57L (90%), DLCO 16.6 (77%) PFT 11/11/21: FVC 2.75L (83%), FEV1 2.30 (95%), TLC 3.95L (69%), DLCO 15.9 (69%) PFT 4/12/2023: FVC 2.66L (81%), FEV1 2.29L (97%), TLC 4.01L (70%), DLCO 15.08 (72%) PFT 1/23/2024: FVC 2.51L (78%), FEV1 2.12L (91%), TLC 3.84L (67%), DLCO 13.63 (65%) PFT 8/1/2024: FVC 2.61L (82%), FEV1 2.16L (94%), TLC 3.68L (64%), DLCO 14.49 (70%)  Impression: Interstitial lung disease / probable UIP - asymptomatic Esbriet 7/2023 - Nonproductive cough - likely GERD, less likely progression of UIP  Plan: Continue with Esbriet Trial of PPI for likely reflux induced cough Return to clinic in 3 months  Labs today -- Attending Addendum  Seen and examined by me and agree w above. Stable aside from some cough, drinking daily, having reflux. Watch the EtOH, start PPI, follow up 3 mos. PFT stable.

## 2024-08-01 NOTE — PHYSICAL EXAM
[No Acute Distress] : no acute distress [Normal Oropharynx] : normal oropharynx [Normal Appearance] : normal appearance [No Neck Mass] : no neck mass [Normal Rate/Rhythm] : normal rate/rhythm [Normal S1, S2] : normal s1, s2 [No Murmurs] : no murmurs [No Resp Distress] : no resp distress [No Acc Muscle Use] : no acc muscle use [Clear to Auscultation Bilaterally] : clear to auscultation bilaterally [Normal Gait] : normal gait [No Clubbing] : no clubbing [No Edema] : no edema [Normal Color/ Pigmentation] : normal color/ pigmentation [No Focal Deficits] : no focal deficits [Oriented x3] : oriented x3 [TextBox_89] : Protuberant

## 2024-08-02 LAB
ALBUMIN SERPL ELPH-MCNC: 4.6 G/DL
ALP BLD-CCNC: 97 U/L
ALT SERPL-CCNC: 19 U/L
AST SERPL-CCNC: 18 U/L
BILIRUB DIRECT SERPL-MCNC: 0.1 MG/DL
BILIRUB INDIRECT SERPL-MCNC: 0.1 MG/DL
BILIRUB SERPL-MCNC: 0.2 MG/DL
PROT SERPL-MCNC: 7.2 G/DL

## 2024-09-04 ENCOUNTER — APPOINTMENT (OUTPATIENT)
Dept: OTOLARYNGOLOGY | Facility: CLINIC | Age: 74
End: 2024-09-04
Payer: MEDICARE

## 2024-09-04 VITALS — HEIGHT: 63 IN | BODY MASS INDEX: 27.82 KG/M2 | WEIGHT: 157 LBS

## 2024-09-04 DIAGNOSIS — H90.3 SENSORINEURAL HEARING LOSS, BILATERAL: ICD-10-CM

## 2024-09-04 DIAGNOSIS — H93.13 TINNITUS, BILATERAL: ICD-10-CM

## 2024-09-04 DIAGNOSIS — H69.93 UNSPECIFIED EUSTACHIAN TUBE DISORDER, BILATERAL: ICD-10-CM

## 2024-09-04 PROCEDURE — 92567 TYMPANOMETRY: CPT

## 2024-09-04 PROCEDURE — 99203 OFFICE O/P NEW LOW 30 MIN: CPT

## 2024-09-04 PROCEDURE — 92557 COMPREHENSIVE HEARING TEST: CPT

## 2024-09-04 NOTE — REVIEW OF SYSTEMS
[Hearing Loss] : hearing loss [Patient Intake Form Reviewed] : Patient intake form was reviewed [Negative] : Ear [de-identified] : used hearing aids but they broke, needs evaluation in order to get new ones, tinnitus

## 2024-09-04 NOTE — DATA REVIEWED
[de-identified] :  Type A tymps, AU. Testing via inserts: AD- WNL up to 500Hz, sloping to a mild sloping to mod / mod-sev SNHL. AS- WNL up to 1000Hz, sloping to a mild to mod-sev SNHL. Recs: 1) F/u w/ MD 2) Continue wearing hearing aids 3) Further testing as per MD 4) Annual

## 2024-09-12 ENCOUNTER — APPOINTMENT (OUTPATIENT)
Dept: PHARMACY | Facility: CLINIC | Age: 74
End: 2024-09-12

## 2024-09-12 PROCEDURE — V5264E: CUSTOM

## 2024-09-12 PROCEDURE — V5010 ASSESSMENT FOR HEARING AID: CPT | Mod: NC

## 2024-09-12 NOTE — PROCEDURE
[de-identified] :  HHIA was performed and pts Total score of 52%, his S score of 50% and his E score of 54% all fell in the significant handicap range.   Counselled pt re:styles, technologies, rechargeability, fitting procedures and 45 day trial basis.   Performed demo of [employer]x Smart AUBREY and pt immediately reported improved comfort and natural sound quality.  Pt opted to purchase [employer] Smart AUBREY with No. 1M receivers.  Chose TV play and Sound Assist as complementary accessories.  Purchase agreement signed.   As pt does not want to change domes, EMIs taken Au without incident and custom molds ordered.

## 2024-09-12 NOTE — HISTORY OF PRESENT ILLNESS
[FreeTextEntry1] : 74 year old male with medical clearance from ENT Dr. Sotelo.  Most recent audiological evaluation revealed hearing WNL followed by a mild sloping to moderately severe sensorineural hearing loss above 500Hz, bilaterally.  Pt reported difficulties hearing in difficult listening environments like courtooms, restauraunts and theatres.  Pt is a  and HL affects his work.  Pt reports tinnitus, but denies otalgia, otorrhea, ear infections, hearing loss, dizziness, vertigo or headaches related to hearing.  Mr. Velasquez was fit with [employer] [place] 2 Pro hearing aids about 9 years ago.  Pt stated he was never satisfied with how they worked, found domes itchy, didn't like having to change domes, wax guards and batteries.  He has not worn the aids in some time.

## 2024-10-16 ENCOUNTER — APPOINTMENT (OUTPATIENT)
Dept: PHARMACY | Facility: CLINIC | Age: 74
End: 2024-10-16
Payer: SELF-PAY

## 2024-10-16 PROCEDURE — V5261K: CUSTOM

## 2024-11-07 ENCOUNTER — APPOINTMENT (OUTPATIENT)
Dept: PHARMACY | Facility: CLINIC | Age: 74
End: 2024-11-07

## 2024-11-07 PROCEDURE — V5267D: CUSTOM

## 2024-11-19 ENCOUNTER — NON-APPOINTMENT (OUTPATIENT)
Age: 74
End: 2024-11-19

## 2024-11-19 ENCOUNTER — APPOINTMENT (OUTPATIENT)
Dept: PULMONOLOGY | Facility: CLINIC | Age: 74
End: 2024-11-19
Payer: MEDICARE

## 2024-11-19 VITALS
OXYGEN SATURATION: 95 % | HEART RATE: 80 BPM | HEIGHT: 63 IN | WEIGHT: 157 LBS | TEMPERATURE: 98.4 F | BODY MASS INDEX: 27.82 KG/M2 | DIASTOLIC BLOOD PRESSURE: 70 MMHG | SYSTOLIC BLOOD PRESSURE: 130 MMHG

## 2024-11-19 DIAGNOSIS — J84.9 INTERSTITIAL PULMONARY DISEASE, UNSPECIFIED: ICD-10-CM

## 2024-11-19 PROCEDURE — 99213 OFFICE O/P EST LOW 20 MIN: CPT

## 2024-11-19 PROCEDURE — 90662 IIV NO PRSV INCREASED AG IM: CPT

## 2024-11-19 PROCEDURE — 36415 COLL VENOUS BLD VENIPUNCTURE: CPT

## 2024-11-19 PROCEDURE — G2211 COMPLEX E/M VISIT ADD ON: CPT

## 2024-11-19 PROCEDURE — G0008: CPT

## 2024-11-20 LAB
ALBUMIN SERPL ELPH-MCNC: 4.6 G/DL
ALP BLD-CCNC: 83 U/L
ALT SERPL-CCNC: 17 U/L
AST SERPL-CCNC: 12 U/L
BILIRUB DIRECT SERPL-MCNC: 0.1 MG/DL
BILIRUB INDIRECT SERPL-MCNC: 0.3 MG/DL
BILIRUB SERPL-MCNC: 0.4 MG/DL
PROT SERPL-MCNC: 7.3 G/DL

## 2024-12-26 ENCOUNTER — APPOINTMENT (OUTPATIENT)
Dept: PHARMACY | Facility: CLINIC | Age: 74
End: 2024-12-26
Payer: SELF-PAY

## 2024-12-26 PROCEDURE — V5014D: CUSTOM

## 2024-12-26 PROCEDURE — V5267D: CUSTOM

## 2025-01-15 ENCOUNTER — APPOINTMENT (OUTPATIENT)
Dept: PHARMACY | Facility: CLINIC | Age: 75
End: 2025-01-15

## 2025-02-04 ENCOUNTER — APPOINTMENT (OUTPATIENT)
Dept: PULMONOLOGY | Facility: CLINIC | Age: 75
End: 2025-02-04
Payer: MEDICARE

## 2025-02-04 VITALS
SYSTOLIC BLOOD PRESSURE: 130 MMHG | HEIGHT: 63 IN | HEART RATE: 84 BPM | OXYGEN SATURATION: 95 % | TEMPERATURE: 97.7 F | DIASTOLIC BLOOD PRESSURE: 90 MMHG | BODY MASS INDEX: 27.82 KG/M2 | WEIGHT: 157 LBS

## 2025-02-04 DIAGNOSIS — J84.9 INTERSTITIAL PULMONARY DISEASE, UNSPECIFIED: ICD-10-CM

## 2025-02-04 PROCEDURE — G2211 COMPLEX E/M VISIT ADD ON: CPT

## 2025-02-04 PROCEDURE — 36415 COLL VENOUS BLD VENIPUNCTURE: CPT

## 2025-02-04 PROCEDURE — 99213 OFFICE O/P EST LOW 20 MIN: CPT

## 2025-02-05 LAB
ALBUMIN SERPL ELPH-MCNC: 4.5 G/DL
ALP BLD-CCNC: 86 U/L
ALT SERPL-CCNC: 15 U/L
AST SERPL-CCNC: 10 U/L
BILIRUB DIRECT SERPL-MCNC: 0.1 MG/DL
BILIRUB INDIRECT SERPL-MCNC: 0.2 MG/DL
BILIRUB SERPL-MCNC: 0.4 MG/DL
PROT SERPL-MCNC: 7.2 G/DL

## 2025-02-06 ENCOUNTER — APPOINTMENT (OUTPATIENT)
Dept: PHARMACY | Facility: CLINIC | Age: 75
End: 2025-02-06
Payer: SELF-PAY

## 2025-02-06 PROCEDURE — V5267D: CUSTOM

## 2025-02-06 PROCEDURE — V5299A: CUSTOM

## 2025-04-02 ENCOUNTER — APPOINTMENT (OUTPATIENT)
Dept: PHARMACY | Facility: CLINIC | Age: 75
End: 2025-04-02
Payer: MEDICARE

## 2025-04-02 PROCEDURE — V5267D: CUSTOM

## 2025-04-24 ENCOUNTER — APPOINTMENT (OUTPATIENT)
Dept: PHARMACY | Facility: CLINIC | Age: 75
End: 2025-04-24
Payer: SELF-PAY

## 2025-04-24 PROCEDURE — V5267D: CUSTOM

## 2025-05-16 ENCOUNTER — APPOINTMENT (OUTPATIENT)
Dept: PULMONOLOGY | Facility: CLINIC | Age: 75
End: 2025-05-16
Payer: MEDICARE

## 2025-05-16 VITALS
SYSTOLIC BLOOD PRESSURE: 126 MMHG | OXYGEN SATURATION: 97 % | DIASTOLIC BLOOD PRESSURE: 70 MMHG | HEART RATE: 91 BPM | WEIGHT: 157 LBS | HEIGHT: 63 IN | BODY MASS INDEX: 27.82 KG/M2 | TEMPERATURE: 97 F

## 2025-05-16 DIAGNOSIS — J84.9 INTERSTITIAL PULMONARY DISEASE, UNSPECIFIED: ICD-10-CM

## 2025-05-16 PROCEDURE — G2211 COMPLEX E/M VISIT ADD ON: CPT

## 2025-05-16 PROCEDURE — 36415 COLL VENOUS BLD VENIPUNCTURE: CPT

## 2025-05-16 PROCEDURE — 99213 OFFICE O/P EST LOW 20 MIN: CPT

## 2025-05-19 LAB
ALBUMIN SERPL ELPH-MCNC: 4.3 G/DL
ALP BLD-CCNC: 93 U/L
ALT SERPL-CCNC: 21 U/L
AST SERPL-CCNC: 19 U/L
BILIRUB DIRECT SERPL-MCNC: 0.1 MG/DL
BILIRUB INDIRECT SERPL-MCNC: 0.2 MG/DL
BILIRUB SERPL-MCNC: 0.3 MG/DL
PROT SERPL-MCNC: 7 G/DL

## 2025-08-22 ENCOUNTER — APPOINTMENT (OUTPATIENT)
Dept: PULMONOLOGY | Facility: CLINIC | Age: 75
End: 2025-08-22

## 2025-08-22 VITALS
OXYGEN SATURATION: 96 % | WEIGHT: 145 LBS | HEART RATE: 66 BPM | SYSTOLIC BLOOD PRESSURE: 144 MMHG | DIASTOLIC BLOOD PRESSURE: 85 MMHG | TEMPERATURE: 98 F | BODY MASS INDEX: 25.69 KG/M2 | HEIGHT: 63 IN

## 2025-08-22 DIAGNOSIS — J84.9 INTERSTITIAL PULMONARY DISEASE, UNSPECIFIED: ICD-10-CM

## 2025-08-22 PROCEDURE — G2211 COMPLEX E/M VISIT ADD ON: CPT

## 2025-08-22 PROCEDURE — 90471 IMMUNIZATION ADMIN: CPT

## 2025-08-22 PROCEDURE — 90679 RSV VACC PREF RECOMB ADJT IM: CPT | Mod: GY

## 2025-08-22 PROCEDURE — 99213 OFFICE O/P EST LOW 20 MIN: CPT | Mod: 25
